# Patient Record
Sex: FEMALE | Race: WHITE | NOT HISPANIC OR LATINO | URBAN - METROPOLITAN AREA
[De-identification: names, ages, dates, MRNs, and addresses within clinical notes are randomized per-mention and may not be internally consistent; named-entity substitution may affect disease eponyms.]

---

## 2018-01-01 ENCOUNTER — INPATIENT (INPATIENT)
Facility: HOSPITAL | Age: 83
LOS: 1 days | End: 2018-10-15
Attending: INTERNAL MEDICINE | Admitting: INTERNAL MEDICINE
Payer: COMMERCIAL

## 2018-01-01 VITALS — TEMPERATURE: 99 F

## 2018-01-01 VITALS
SYSTOLIC BLOOD PRESSURE: 87 MMHG | OXYGEN SATURATION: 99 % | DIASTOLIC BLOOD PRESSURE: 51 MMHG | RESPIRATION RATE: 24 BRPM | HEART RATE: 93 BPM

## 2018-01-01 DIAGNOSIS — Z90.11 ACQUIRED ABSENCE OF RIGHT BREAST AND NIPPLE: Chronic | ICD-10-CM

## 2018-01-01 LAB
% ALBUMIN: 40.7 % — SIGNIFICANT CHANGE UP
% ALPHA 1: 7.7 % — SIGNIFICANT CHANGE UP
% ALPHA 2: 15.5 % — SIGNIFICANT CHANGE UP
% BETA: 12.7 % — SIGNIFICANT CHANGE UP
% GAMMA: 23.4 % — SIGNIFICANT CHANGE UP
ABO RH CONFIRMATION: SIGNIFICANT CHANGE UP
ALBUMIN SERPL ELPH-MCNC: 2.6 G/DL — LOW (ref 3.6–5.5)
ALBUMIN SERPL ELPH-MCNC: 3 G/DL — LOW (ref 3.5–5.2)
ALBUMIN SERPL ELPH-MCNC: 3.4 G/DL — LOW (ref 3.5–5.2)
ALBUMIN/GLOB SERPL ELPH: 0.7 RATIO — SIGNIFICANT CHANGE UP
ALP SERPL-CCNC: 49 U/L — SIGNIFICANT CHANGE UP (ref 30–115)
ALP SERPL-CCNC: 52 U/L — SIGNIFICANT CHANGE UP (ref 30–115)
ALPHA1 GLOB SERPL ELPH-MCNC: 0.5 G/DL — HIGH (ref 0.1–0.4)
ALPHA2 GLOB SERPL ELPH-MCNC: 1 G/DL — SIGNIFICANT CHANGE UP (ref 0.5–1)
ALT FLD-CCNC: 375 U/L — HIGH (ref 0–41)
ALT FLD-CCNC: 9 U/L — SIGNIFICANT CHANGE UP (ref 0–41)
ANION GAP SERPL CALC-SCNC: 25 MMOL/L — HIGH (ref 7–14)
ANION GAP SERPL CALC-SCNC: 26 MMOL/L — HIGH (ref 7–14)
APPEARANCE UR: CLEAR — SIGNIFICANT CHANGE UP
APTT BLD: 23.4 SEC — CRITICAL LOW (ref 27–39.2)
APTT BLD: 25.9 SEC — LOW (ref 27–39.2)
AST SERPL-CCNC: 15 U/L — SIGNIFICANT CHANGE UP (ref 0–41)
AST SERPL-CCNC: 678 U/L — HIGH (ref 0–41)
B-GLOBULIN SERPL ELPH-MCNC: 0.8 G/DL — SIGNIFICANT CHANGE UP (ref 0.5–1)
BACTERIA # UR AUTO: ABNORMAL
BASOPHILS # BLD AUTO: 0 K/UL — SIGNIFICANT CHANGE UP (ref 0–0.2)
BASOPHILS NFR BLD AUTO: 0 % — SIGNIFICANT CHANGE UP (ref 0–1)
BILIRUB DIRECT SERPL-MCNC: 0.3 MG/DL — HIGH (ref 0–0.2)
BILIRUB INDIRECT FLD-MCNC: 0.7 MG/DL — SIGNIFICANT CHANGE UP (ref 0.2–1.2)
BILIRUB SERPL-MCNC: 0.9 MG/DL — SIGNIFICANT CHANGE UP (ref 0.2–1.2)
BILIRUB SERPL-MCNC: 1 MG/DL — SIGNIFICANT CHANGE UP (ref 0.2–1.2)
BILIRUB UR-MCNC: NEGATIVE — SIGNIFICANT CHANGE UP
BUN SERPL-MCNC: 20 MG/DL — SIGNIFICANT CHANGE UP (ref 10–20)
BUN SERPL-MCNC: 34 MG/DL — HIGH (ref 10–20)
BURR CELLS BLD QL SMEAR: PRESENT — SIGNIFICANT CHANGE UP
CALCIUM SERPL-MCNC: 7.6 MG/DL — LOW (ref 8.5–10.1)
CALCIUM SERPL-MCNC: 8.5 MG/DL — SIGNIFICANT CHANGE UP (ref 8.5–10.1)
CHLORIDE SERPL-SCNC: 90 MMOL/L — LOW (ref 98–110)
CHLORIDE SERPL-SCNC: 94 MMOL/L — LOW (ref 98–110)
CK MB CFR SERPL CALC: 132.2 NG/ML — HIGH (ref 0.6–6.3)
CK SERPL-CCNC: 1626 U/L — HIGH (ref 0–225)
CO2 SERPL-SCNC: 13 MMOL/L — LOW (ref 17–32)
CO2 SERPL-SCNC: 15 MMOL/L — LOW (ref 17–32)
COLOR SPEC: YELLOW — SIGNIFICANT CHANGE UP
CREAT SERPL-MCNC: 1 MG/DL — SIGNIFICANT CHANGE UP (ref 0.7–1.5)
CREAT SERPL-MCNC: 1.5 MG/DL — SIGNIFICANT CHANGE UP (ref 0.7–1.5)
CULTURE RESULTS: NO GROWTH — SIGNIFICANT CHANGE UP
CULTURE RESULTS: NO GROWTH — SIGNIFICANT CHANGE UP
DIFF PNL FLD: ABNORMAL
EOSINOPHIL # BLD AUTO: 0 K/UL — SIGNIFICANT CHANGE UP (ref 0–0.7)
EOSINOPHIL NFR BLD AUTO: 0 % — SIGNIFICANT CHANGE UP (ref 0–8)
EPI CELLS # UR: ABNORMAL /HPF
ESTIMATED AVERAGE GLUCOSE: 160 MG/DL — HIGH (ref 68–114)
FERRITIN SERPL-MCNC: 1232 NG/ML — HIGH (ref 15–150)
FOLATE SERPL-MCNC: >20 NG/ML — SIGNIFICANT CHANGE UP
GAMMA GLOBULIN: 1.5 G/DL — SIGNIFICANT CHANGE UP (ref 0.6–1.6)
GAS PNL BLDA: SIGNIFICANT CHANGE UP
GLUCOSE BLDC GLUCOMTR-MCNC: 203 MG/DL — HIGH (ref 70–99)
GLUCOSE BLDC GLUCOMTR-MCNC: 204 MG/DL — HIGH (ref 70–99)
GLUCOSE BLDC GLUCOMTR-MCNC: 215 MG/DL — HIGH (ref 70–99)
GLUCOSE BLDC GLUCOMTR-MCNC: 239 MG/DL — HIGH (ref 70–99)
GLUCOSE BLDC GLUCOMTR-MCNC: 241 MG/DL — HIGH (ref 70–99)
GLUCOSE BLDC GLUCOMTR-MCNC: 265 MG/DL — HIGH (ref 70–99)
GLUCOSE BLDC GLUCOMTR-MCNC: 279 MG/DL — HIGH (ref 70–99)
GLUCOSE BLDC GLUCOMTR-MCNC: 308 MG/DL — HIGH (ref 70–99)
GLUCOSE BLDC GLUCOMTR-MCNC: 330 MG/DL — HIGH (ref 70–99)
GLUCOSE SERPL-MCNC: 223 MG/DL — HIGH (ref 70–99)
GLUCOSE SERPL-MCNC: 272 MG/DL — HIGH (ref 70–99)
GLUCOSE UR QL: NEGATIVE MG/DL — SIGNIFICANT CHANGE UP
HAPTOGLOB SERPL-MCNC: 373 MG/DL — HIGH (ref 34–200)
HAV IGM SER-ACNC: SIGNIFICANT CHANGE UP
HBA1C BLD-MCNC: 7.2 % — HIGH (ref 4–5.6)
HBV CORE IGM SER-ACNC: SIGNIFICANT CHANGE UP
HBV SURFACE AG SER-ACNC: SIGNIFICANT CHANGE UP
HCT VFR BLD CALC: 25.3 % — LOW (ref 37–47)
HCT VFR BLD CALC: 26.5 % — LOW (ref 37–47)
HCT VFR BLD CALC: 26.7 % — LOW (ref 37–47)
HCV AB S/CO SERPL IA: 0.16 S/CO — SIGNIFICANT CHANGE UP
HCV AB SERPL-IMP: SIGNIFICANT CHANGE UP
HGB BLD-MCNC: 8 G/DL — LOW (ref 12–16)
HGB BLD-MCNC: 8.1 G/DL — LOW (ref 12–16)
HGB BLD-MCNC: 8.9 G/DL — LOW (ref 12–16)
HIV 1+2 AB+HIV1 P24 AG SERPL QL IA: SIGNIFICANT CHANGE UP
IMM GRANULOCYTES NFR BLD AUTO: 2.3 % — HIGH (ref 0.1–0.3)
INR BLD: 1.73 RATIO — HIGH (ref 0.65–1.3)
INR BLD: 1.78 RATIO — HIGH (ref 0.65–1.3)
INTERPRETATION SERPL IFE-IMP: SIGNIFICANT CHANGE UP
KETONES UR-MCNC: ABNORMAL
LACTATE SERPL-SCNC: 4.7 MMOL/L — CRITICAL HIGH (ref 0.5–2.2)
LACTATE SERPL-SCNC: 5.7 MMOL/L — CRITICAL HIGH (ref 0.5–2.2)
LDH SERPL L TO P-CCNC: 186 — SIGNIFICANT CHANGE UP (ref 50–242)
LEUKOCYTE ESTERASE UR-ACNC: NEGATIVE — SIGNIFICANT CHANGE UP
LIDOCAIN IGE QN: 9 U/L — SIGNIFICANT CHANGE UP (ref 7–60)
LYMPHOCYTES # BLD AUTO: 0.23 K/UL — LOW (ref 1.2–3.4)
LYMPHOCYTES # BLD AUTO: 53.5 % — HIGH (ref 20.5–51.1)
MCHC RBC-ENTMCNC: 28.2 PG — SIGNIFICANT CHANGE UP (ref 27–31)
MCHC RBC-ENTMCNC: 28.6 PG — SIGNIFICANT CHANGE UP (ref 27–31)
MCHC RBC-ENTMCNC: 28.8 PG — SIGNIFICANT CHANGE UP (ref 27–31)
MCHC RBC-ENTMCNC: 30 G/DL — LOW (ref 32–37)
MCHC RBC-ENTMCNC: 32 G/DL — SIGNIFICANT CHANGE UP (ref 32–37)
MCHC RBC-ENTMCNC: 33.6 G/DL — SIGNIFICANT CHANGE UP (ref 32–37)
MCV RBC AUTO: 85.8 FL — SIGNIFICANT CHANGE UP (ref 81–99)
MCV RBC AUTO: 88.2 FL — SIGNIFICANT CHANGE UP (ref 81–99)
MCV RBC AUTO: 95.4 FL — SIGNIFICANT CHANGE UP (ref 81–99)
MONOCYTES # BLD AUTO: 0.15 K/UL — SIGNIFICANT CHANGE UP (ref 0.1–0.6)
MONOCYTES NFR BLD AUTO: 34.9 % — HIGH (ref 1.7–9.3)
NEUTROPHILS # BLD AUTO: 0.04 K/UL — LOW (ref 1.4–6.5)
NEUTROPHILS NFR BLD AUTO: 9.3 % — LOW (ref 42.2–75.2)
NITRITE UR-MCNC: NEGATIVE — SIGNIFICANT CHANGE UP
NRBC # BLD: 0 /100 WBCS — SIGNIFICANT CHANGE UP (ref 0–0)
NRBC # BLD: 2 /100 — HIGH (ref 0–0)
NRBC # BLD: 3 /100 WBCS — HIGH (ref 0–0)
NRBC # BLD: 5 /100 WBCS — HIGH (ref 0–0)
NT-PROBNP SERPL-SCNC: 3461 PG/ML — HIGH (ref 0–300)
PH UR: 5.5 — SIGNIFICANT CHANGE UP (ref 5–8)
PLAT MORPH BLD: NORMAL — SIGNIFICANT CHANGE UP
PLATELET # BLD AUTO: 15 K/UL — CRITICAL LOW (ref 130–400)
PLATELET # BLD AUTO: 15 K/UL — CRITICAL LOW (ref 130–400)
PLATELET # BLD AUTO: 21 K/UL — LOW (ref 130–400)
PLATELET COUNT - ESTIMATE: ABNORMAL
POTASSIUM SERPL-MCNC: 4.5 MMOL/L — SIGNIFICANT CHANGE UP (ref 3.5–5)
POTASSIUM SERPL-MCNC: 4.9 MMOL/L — SIGNIFICANT CHANGE UP (ref 3.5–5)
POTASSIUM SERPL-SCNC: 4.5 MMOL/L — SIGNIFICANT CHANGE UP (ref 3.5–5)
POTASSIUM SERPL-SCNC: 4.9 MMOL/L — SIGNIFICANT CHANGE UP (ref 3.5–5)
PROT PATTERN SERPL ELPH-IMP: SIGNIFICANT CHANGE UP
PROT SERPL-MCNC: 6.3 G/DL — SIGNIFICANT CHANGE UP (ref 6–8.3)
PROT SERPL-MCNC: 6.3 G/DL — SIGNIFICANT CHANGE UP (ref 6–8.3)
PROT SERPL-MCNC: 6.4 G/DL — SIGNIFICANT CHANGE UP (ref 6–8)
PROT SERPL-MCNC: 7.2 G/DL — SIGNIFICANT CHANGE UP (ref 6–8)
PROT UR-MCNC: 30 MG/DL
PROTHROM AB SERPL-ACNC: 18.9 SEC — HIGH (ref 9.95–12.87)
PROTHROM AB SERPL-ACNC: 19.4 SEC — HIGH (ref 9.95–12.87)
RBC # BLD: 2.8 M/UL — LOW (ref 4.2–5.4)
RBC # BLD: 2.87 M/UL — LOW (ref 4.2–5.4)
RBC # BLD: 3.03 M/UL — LOW (ref 4.2–5.4)
RBC # BLD: 3.09 M/UL — LOW (ref 4.2–5.4)
RBC # FLD: 15.7 % — HIGH (ref 11.5–14.5)
RBC # FLD: 16.5 % — HIGH (ref 11.5–14.5)
RBC # FLD: 16.7 % — HIGH (ref 11.5–14.5)
RBC BLD AUTO: ABNORMAL
RBC CASTS # UR COMP ASSIST: ABNORMAL /HPF
RETICS #: 46.8 K/UL — SIGNIFICANT CHANGE UP (ref 25–125)
RETICS/RBC NFR: 1.7 % — HIGH (ref 0.5–1.5)
SCHISTOCYTES BLD QL AUTO: SLIGHT — SIGNIFICANT CHANGE UP
SODIUM SERPL-SCNC: 130 MMOL/L — LOW (ref 135–146)
SODIUM SERPL-SCNC: 133 MMOL/L — LOW (ref 135–146)
SP GR SPEC: 1.02 — SIGNIFICANT CHANGE UP (ref 1.01–1.03)
SPECIMEN SOURCE: SIGNIFICANT CHANGE UP
SPECIMEN SOURCE: SIGNIFICANT CHANGE UP
TROPONIN T SERPL-MCNC: 0.04 NG/ML — CRITICAL HIGH
TROPONIN T SERPL-MCNC: 10.58 NG/ML — CRITICAL HIGH
TROPONIN T SERPL-MCNC: 2.78 NG/ML — CRITICAL HIGH
TSH SERPL-MCNC: 0.14 UIU/ML — LOW (ref 0.27–4.2)
TYPE + AB SCN PNL BLD: SIGNIFICANT CHANGE UP
UROBILINOGEN FLD QL: 0.2 MG/DL — SIGNIFICANT CHANGE UP (ref 0.2–0.2)
VARIANT LYMPHS # BLD: 16 % — HIGH (ref 0–5)
VIT B12 SERPL-MCNC: 495 PG/ML — SIGNIFICANT CHANGE UP (ref 232–1245)
WBC # BLD: 0.43 K/UL — CRITICAL LOW (ref 4.8–10.8)
WBC # BLD: 0.74 K/UL — CRITICAL LOW (ref 4.8–10.8)
WBC # BLD: 1.85 K/UL — LOW (ref 4.8–10.8)
WBC # FLD AUTO: 0.43 K/UL — CRITICAL LOW (ref 4.8–10.8)
WBC # FLD AUTO: 0.74 K/UL — CRITICAL LOW (ref 4.8–10.8)
WBC # FLD AUTO: 1.85 K/UL — LOW (ref 4.8–10.8)
WBC UR QL: SIGNIFICANT CHANGE UP /HPF

## 2018-01-01 PROCEDURE — 93970 EXTREMITY STUDY: CPT | Mod: 26

## 2018-01-01 RX ORDER — ONDANSETRON 8 MG/1
4 TABLET, FILM COATED ORAL EVERY 8 HOURS
Qty: 0 | Refills: 0 | Status: DISCONTINUED | OUTPATIENT
Start: 2018-01-01 | End: 2018-01-01

## 2018-01-01 RX ORDER — DEXTROSE 50 % IN WATER 50 %
25 SYRINGE (ML) INTRAVENOUS ONCE
Qty: 0 | Refills: 0 | Status: DISCONTINUED | OUTPATIENT
Start: 2018-01-01 | End: 2018-01-01

## 2018-01-01 RX ORDER — INSULIN LISPRO 100/ML
3 VIAL (ML) SUBCUTANEOUS
Qty: 0 | Refills: 0 | Status: DISCONTINUED | OUTPATIENT
Start: 2018-01-01 | End: 2018-01-01

## 2018-01-01 RX ORDER — ATORVASTATIN CALCIUM 80 MG/1
10 TABLET, FILM COATED ORAL AT BEDTIME
Qty: 0 | Refills: 0 | Status: DISCONTINUED | OUTPATIENT
Start: 2018-01-01 | End: 2018-01-01

## 2018-01-01 RX ORDER — DEXTROSE 50 % IN WATER 50 %
12.5 SYRINGE (ML) INTRAVENOUS ONCE
Qty: 0 | Refills: 0 | Status: DISCONTINUED | OUTPATIENT
Start: 2018-01-01 | End: 2018-01-01

## 2018-01-01 RX ORDER — MEROPENEM 1 G/30ML
1000 INJECTION INTRAVENOUS EVERY 12 HOURS
Qty: 0 | Refills: 0 | Status: DISCONTINUED | OUTPATIENT
Start: 2018-01-01 | End: 2018-01-01

## 2018-01-01 RX ORDER — FUROSEMIDE 40 MG
40 TABLET ORAL ONCE
Qty: 0 | Refills: 0 | Status: COMPLETED | OUTPATIENT
Start: 2018-01-01 | End: 2018-01-01

## 2018-01-01 RX ORDER — SODIUM CHLORIDE 9 MG/ML
1000 INJECTION INTRAMUSCULAR; INTRAVENOUS; SUBCUTANEOUS
Qty: 0 | Refills: 0 | Status: DISCONTINUED | OUTPATIENT
Start: 2018-01-01 | End: 2018-01-01

## 2018-01-01 RX ORDER — GLUCAGON INJECTION, SOLUTION 0.5 MG/.1ML
1 INJECTION, SOLUTION SUBCUTANEOUS ONCE
Qty: 0 | Refills: 0 | Status: DISCONTINUED | OUTPATIENT
Start: 2018-01-01 | End: 2018-01-01

## 2018-01-01 RX ORDER — LEVOTHYROXINE SODIUM 125 MCG
1 TABLET ORAL
Qty: 0 | Refills: 0 | COMMUNITY

## 2018-01-01 RX ORDER — ATORVASTATIN CALCIUM 80 MG/1
1 TABLET, FILM COATED ORAL
Qty: 0 | Refills: 0 | COMMUNITY

## 2018-01-01 RX ORDER — GLIMEPIRIDE 1 MG
1 TABLET ORAL
Qty: 0 | Refills: 0 | COMMUNITY

## 2018-01-01 RX ORDER — LEVOTHYROXINE SODIUM 125 MCG
100 TABLET ORAL DAILY
Qty: 0 | Refills: 0 | Status: DISCONTINUED | OUTPATIENT
Start: 2018-01-01 | End: 2018-01-01

## 2018-01-01 RX ORDER — INSULIN GLARGINE 100 [IU]/ML
10 INJECTION, SOLUTION SUBCUTANEOUS AT BEDTIME
Qty: 0 | Refills: 0 | Status: DISCONTINUED | OUTPATIENT
Start: 2018-01-01 | End: 2018-01-01

## 2018-01-01 RX ORDER — INFLUENZA VIRUS VACCINE 15; 15; 15; 15 UG/.5ML; UG/.5ML; UG/.5ML; UG/.5ML
0.5 SUSPENSION INTRAMUSCULAR ONCE
Qty: 0 | Refills: 0 | Status: DISCONTINUED | OUTPATIENT
Start: 2018-01-01 | End: 2018-01-01

## 2018-01-01 RX ORDER — SODIUM CHLORIDE 9 MG/ML
500 INJECTION INTRAMUSCULAR; INTRAVENOUS; SUBCUTANEOUS ONCE
Qty: 0 | Refills: 0 | Status: COMPLETED | OUTPATIENT
Start: 2018-01-01 | End: 2018-01-01

## 2018-01-01 RX ORDER — DEXTROSE 50 % IN WATER 50 %
15 SYRINGE (ML) INTRAVENOUS ONCE
Qty: 0 | Refills: 0 | Status: DISCONTINUED | OUTPATIENT
Start: 2018-01-01 | End: 2018-01-01

## 2018-01-01 RX ORDER — NOREPINEPHRINE BITARTRATE/D5W 8 MG/250ML
0.05 PLASTIC BAG, INJECTION (ML) INTRAVENOUS
Qty: 8 | Refills: 0 | Status: DISCONTINUED | OUTPATIENT
Start: 2018-01-01 | End: 2018-01-01

## 2018-01-01 RX ORDER — ASPIRIN/CALCIUM CARB/MAGNESIUM 324 MG
81 TABLET ORAL DAILY
Qty: 0 | Refills: 0 | Status: DISCONTINUED | OUTPATIENT
Start: 2018-01-01 | End: 2018-01-01

## 2018-01-01 RX ORDER — MORPHINE SULFATE 50 MG/1
2 CAPSULE, EXTENDED RELEASE ORAL ONCE
Qty: 0 | Refills: 0 | Status: DISCONTINUED | OUTPATIENT
Start: 2018-01-01 | End: 2018-01-01

## 2018-01-01 RX ORDER — HEPARIN SODIUM 5000 [USP'U]/ML
5000 INJECTION INTRAVENOUS; SUBCUTANEOUS EVERY 8 HOURS
Qty: 0 | Refills: 0 | Status: DISCONTINUED | OUTPATIENT
Start: 2018-01-01 | End: 2018-01-01

## 2018-01-01 RX ORDER — PHYTONADIONE (VIT K1) 5 MG
5 TABLET ORAL ONCE
Qty: 0 | Refills: 0 | Status: COMPLETED | OUTPATIENT
Start: 2018-01-01 | End: 2018-01-01

## 2018-01-01 RX ORDER — SODIUM CHLORIDE 9 MG/ML
1000 INJECTION, SOLUTION INTRAVENOUS
Qty: 0 | Refills: 0 | Status: DISCONTINUED | OUTPATIENT
Start: 2018-01-01 | End: 2018-01-01

## 2018-01-01 RX ORDER — PANTOPRAZOLE SODIUM 20 MG/1
40 TABLET, DELAYED RELEASE ORAL
Qty: 0 | Refills: 0 | Status: DISCONTINUED | OUTPATIENT
Start: 2018-01-01 | End: 2018-01-01

## 2018-01-01 RX ORDER — VANCOMYCIN HCL 1 G
1000 VIAL (EA) INTRAVENOUS EVERY 12 HOURS
Qty: 0 | Refills: 0 | Status: DISCONTINUED | OUTPATIENT
Start: 2018-01-01 | End: 2018-01-01

## 2018-01-01 RX ORDER — SODIUM CHLORIDE 9 MG/ML
1000 INJECTION INTRAMUSCULAR; INTRAVENOUS; SUBCUTANEOUS ONCE
Qty: 0 | Refills: 0 | Status: COMPLETED | OUTPATIENT
Start: 2018-01-01 | End: 2018-01-01

## 2018-01-01 RX ADMIN — Medication 3 UNIT(S): at 17:20

## 2018-01-01 RX ADMIN — Medication 3 UNIT(S): at 12:39

## 2018-01-01 RX ADMIN — Medication 250 MILLIGRAM(S): at 06:16

## 2018-01-01 RX ADMIN — SODIUM CHLORIDE 2000 MILLILITER(S): 9 INJECTION INTRAMUSCULAR; INTRAVENOUS; SUBCUTANEOUS at 12:28

## 2018-01-01 RX ADMIN — Medication 40 MILLIGRAM(S): at 15:35

## 2018-01-01 RX ADMIN — SODIUM CHLORIDE 75 MILLILITER(S): 9 INJECTION INTRAMUSCULAR; INTRAVENOUS; SUBCUTANEOUS at 18:53

## 2018-01-01 RX ADMIN — INSULIN GLARGINE 10 UNIT(S): 100 INJECTION, SOLUTION SUBCUTANEOUS at 22:16

## 2018-01-01 RX ADMIN — MORPHINE SULFATE 2 MILLIGRAM(S): 50 CAPSULE, EXTENDED RELEASE ORAL at 02:22

## 2018-01-01 RX ADMIN — ONDANSETRON 4 MILLIGRAM(S): 8 TABLET, FILM COATED ORAL at 21:00

## 2018-01-01 RX ADMIN — Medication 5.06 MICROGRAM(S)/KG/MIN: at 01:24

## 2018-01-01 RX ADMIN — ONDANSETRON 4 MILLIGRAM(S): 8 TABLET, FILM COATED ORAL at 10:00

## 2018-01-01 RX ADMIN — Medication 100 MICROGRAM(S): at 06:15

## 2018-01-01 RX ADMIN — Medication 3 UNIT(S): at 07:44

## 2018-01-01 RX ADMIN — ATORVASTATIN CALCIUM 10 MILLIGRAM(S): 80 TABLET, FILM COATED ORAL at 23:22

## 2018-01-01 RX ADMIN — Medication 5 MILLIGRAM(S): at 17:21

## 2018-01-01 RX ADMIN — Medication 81 MILLIGRAM(S): at 22:15

## 2018-01-01 RX ADMIN — MEROPENEM 100 MILLIGRAM(S): 1 INJECTION INTRAVENOUS at 05:27

## 2018-01-01 RX ADMIN — ONDANSETRON 4 MILLIGRAM(S): 8 TABLET, FILM COATED ORAL at 22:13

## 2018-01-01 RX ADMIN — Medication 250 MILLIGRAM(S): at 20:12

## 2018-01-01 RX ADMIN — PANTOPRAZOLE SODIUM 40 MILLIGRAM(S): 20 TABLET, DELAYED RELEASE ORAL at 06:15

## 2018-01-01 RX ADMIN — SODIUM CHLORIDE 1000 MILLILITER(S): 9 INJECTION INTRAMUSCULAR; INTRAVENOUS; SUBCUTANEOUS at 06:30

## 2018-01-01 RX ADMIN — SODIUM CHLORIDE 1000 MILLILITER(S): 9 INJECTION INTRAMUSCULAR; INTRAVENOUS; SUBCUTANEOUS at 18:53

## 2018-01-01 RX ADMIN — MEROPENEM 100 MILLIGRAM(S): 1 INJECTION INTRAVENOUS at 20:11

## 2018-01-01 RX ADMIN — Medication 250 MILLIGRAM(S): at 17:20

## 2018-01-01 RX ADMIN — ATORVASTATIN CALCIUM 10 MILLIGRAM(S): 80 TABLET, FILM COATED ORAL at 21:50

## 2018-01-01 RX ADMIN — MEROPENEM 100 MILLIGRAM(S): 1 INJECTION INTRAVENOUS at 17:20

## 2018-01-01 RX ADMIN — INSULIN GLARGINE 10 UNIT(S): 100 INJECTION, SOLUTION SUBCUTANEOUS at 21:53

## 2018-01-01 RX ADMIN — SODIUM CHLORIDE 1000 MILLILITER(S): 9 INJECTION INTRAMUSCULAR; INTRAVENOUS; SUBCUTANEOUS at 16:04

## 2018-10-13 NOTE — ED PROVIDER NOTE - NS ED ROS FT
Review of Systems  Constitutional: (-) fever  Eyes/ENT: (-) blurry vision, (-) epistaxis  Cardiovascular: (-) chest pain, (-) syncope  Respiratory: (-) cough, (+) shortness of breath  Gastrointestinal: (-) vomiting, (-) diarrhea  Musculoskeletal: (-) neck pain, (-) back pain, (-) joint pain  Integumentary: (-) rash, (-) edema  Neurological: (-) headache

## 2018-10-13 NOTE — ED PROVIDER NOTE - OBJECTIVE STATEMENT
Patient is a 85 years old F pmhx DM, Thyroid disease, hypercholesterolemia, osteo presents to the ED for evaluation of shortness of breath x 1 day. As per patient was seen at urgent care Patient is a 85 years old F pmhx DM, Thyroid disease, hypercholesterolemia, osteo presents to the ED for evaluation of shortness of breath x 1 day. As per patient was seen at urgent care this week for cough and cold like symptoms and instructed on likely viral illness.

## 2018-10-13 NOTE — CONSULT NOTE ADULT - SUBJECTIVE AND OBJECTIVE BOX
Patient is a 85y old  Female who presents with a chief complaint of SOB.    HPI: 85 F with PMH as below p/w worsening SOB for one week. She went to urgent care one week ago where they did an EKG and it showed LBBB. They recommended her to f/u with PMD. She was having worsening SOB since then and so the daughter brought her to ER today. She was placed on Bipap and she felt comfortable after placing Bipap.     She was seen by PMD () in August 2018 and the blood work was normal as per family. Blood work done at [facility].    Lives at home by herself. Fully functional one week ago. She was c/o fatigue, loss of appetite, night sweats for last one week.     Full code.      PAST MEDICAL & SURGICAL HISTORY:  Osteoporosis  High cholesterol  HTN (hypertension)  Hypothyroid  Diabetes  Mastectomy    Allergies  penicillins (Urticaria)      Family history : no cardiovscular family history     Home Medications:  enalapril 5 mg oral tablet: 1 tab(s) orally once a day (13 Oct 2018 11:14)  glimepiride 2 mg oral tablet: 1 tab(s) orally once a day (13 Oct 2018 11:14)  Lipitor 10 mg oral tablet: 1 tab(s) orally once a day (13 Oct 2018 11:14)  Synthroid 100 mcg (0.1 mg) oral tablet: 1 tab(s) orally once a day (13 Oct 2018 11:14)    Occupation:  Alochol: Denied  Smoking: Denied  Drug Use: Denied          ROS: as in HPI; All other systems reviewed are negative    ICU Vital Signs Last 24 Hrs  T(C): 37.4 (13 Oct 2018 11:06), Max: 37.4 (13 Oct 2018 11:06)  T(F): 99.4 (13 Oct 2018 11:06), Max: 99.4 (13 Oct 2018 11:06)  HR: 98 (13 Oct 2018 16:20) (98 - 106)  BP: 103/53 (13 Oct 2018 16:20) (103/53 - 118/78)  BP(mean): --  ABP: --  ABP(mean): --  RR: 28 (13 Oct 2018 16:20) (28 - 32)  SpO2: 99% (13 Oct 2018 16:20) (80% - 100%)        Physical Examination:    General: In mild distress. On Bipap.    HEENT: Pupils equal, reactive to light.  Symmetric.    PULM: Clear to auscultation bilaterally, no significant sputum production    CVS: Regular rate and rhythm, no murmurs, rubs, or gallops    ABD: Soft, nondistended, nontender, normoactive bowel sounds, no masses    EXT: No edema, nontender, no clubbing     SKIN: Warm and well perfused, no rashes noted.    Neurology : no motor or sensory deficit     Musculoskeletal : move all extremety     Lymphatic system: no Palpable node           ABG - ( 13 Oct 2018 16:05 )  pH, Arterial: 7.14  pH, Blood: x     /  pCO2: 21    /  pO2: 425   / HCO3: 7     / Base Excess: -20.1 /  SaO2: 100                 I&O's Detail        LABS:                        8.0    1.85  )-----------( 21       ( 13 Oct 2018 15:58 )             26.7     13 Oct 2018 12:30    130    |  90     |  20     ----------------------------<  272    4.5     |  15     |  1.0      Ca    8.5        13 Oct 2018 12:30    TPro  7.2    /  Alb  3.4    /  TBili  1.0    /  DBili  0.3    /  AST  15     /  ALT  9      /  AlkPhos  49     13 Oct 2018 12:30  Amylase x     lipase 9          CARDIAC MARKERS ( 13 Oct 2018 12:30 )  x     / 0.04 ng/mL / x     / x     / x          CAPILLARY BLOOD GLUCOSE            Culture    Lactate, Blood: 4.7 mmol/L (10-13-18 @ 12:30)      MEDICATIONS  (STANDING):    MEDICATIONS  (PRN):        RADIOLOGY: ***     CXR:  TLC:  OG:  ET tube:          ECHO: Patient is a 85y old  Female who presents with a chief complaint of SOB.    Hx Obtained mainly from daughter ate bedside.    HPI: 85 F with PMH as below p/w worsening SOB for almost 2 weeks. She went to urgent care one week ago where they did an EKG and it showed LBBB. They recommended her to f/u with PMD. She was having worsening SOB since then and so the daughter brought her to ER today. She was placed on Bipap and she felt comfortable after placing Bipap.     She was seen by PMD () in August 2018 and the blood work was normal as per family. Blood work done at [facility].    Lives at home by herself. Fully functional prior. She was c/o fatigue, loss of appetite, night sweats for last one week.  no fever, chills, abdominal pain reports weight loss, in ED found to have pancytopenia, and high LA, called to evaluate    Full code.      PAST MEDICAL & SURGICAL HISTORY:  Osteoporosis  High cholesterol  HTN (hypertension)  Hypothyroid  Diabetes  Mastectomy    Allergies  penicillins (Urticaria)      Family history : no cardiovscular family history     Home Medications:  enalapril 5 mg oral tablet: 1 tab(s) orally once a day (13 Oct 2018 11:14)  glimepiride 2 mg oral tablet: 1 tab(s) orally once a day (13 Oct 2018 11:14)  Lipitor 10 mg oral tablet: 1 tab(s) orally once a day (13 Oct 2018 11:14)  Synthroid 100 mcg (0.1 mg) oral tablet: 1 tab(s) orally once a day (13 Oct 2018 11:14)    Occupation:  Alochol: Denied  Smoking: Denied  Drug Use: Denied          ROS: as in HPI; All other systems reviewed are negative    ICU Vital Signs Last 24 Hrs  T(C): 37.4 (13 Oct 2018 11:06), Max: 37.4 (13 Oct 2018 11:06)  T(F): 99.4 (13 Oct 2018 11:06), Max: 99.4 (13 Oct 2018 11:06)  HR: 98 (13 Oct 2018 16:20) (98 - 106)  BP: 103/53 (13 Oct 2018 16:20) (103/53 - 118/78)  RR: 28 (13 Oct 2018 16:20) (28 - 32)  SpO2: 99% (13 Oct 2018 16:20) (80% - 100%)        Physical Examination:    General: In mild distress. On Bipap. ill looking    HEENT: Pupils equal, reactive to light.  Symmetric. pale conjunctiva    PULM: Clear to auscultation bilaterally, no significant sputum production    CVS: Regular rate and rhythm, no murmurs, rubs, or gallops    ABD: Soft, nondistended, nontender, normoactive bowel sounds, no masses    EXT: No edema, nontender, no clubbing     SKIN: Warm and well perfused, no rashes noted.    Neurology : no motor or sensory deficit     Musculoskeletal : move all extremities    Lymphatic system: no Palpable node           ABG - ( 13 Oct 2018 16:05 )  pH, Arterial: 7.14  pH, Blood: x     /  pCO2: 21    /  pO2: 425   / HCO3: 7     / Base Excess: -20.1 /  SaO2: 100                 I&O's Detail        LABS:                        8.0    1.85  )-----------( 21       ( 13 Oct 2018 15:58 )             26.7     13 Oct 2018 12:30    130    |  90     |  20     ----------------------------<  272    4.5     |  15     |  1.0      Ca    8.5        13 Oct 2018 12:30    TPro  7.2    /  Alb  3.4    /  TBili  1.0    /  DBili  0.3    /  AST  15     /  ALT  9      /  AlkPhos  49     13 Oct 2018 12:30  Amylase x     lipase 9          CARDIAC MARKERS ( 13 Oct 2018 12:30 )  x     / 0.04 ng/mL / x     / x     / x               Culture    Lactate, Blood: 4.7 mmol/L (10-13-18 @ 12:30)    EKG/ CXR reviewed

## 2018-10-13 NOTE — CONSULT NOTE ADULT - ASSESSMENT
IMPRESSION: SOB secondary to ?Fluid overload                      Lactic acidosis                      Pancytopenia        PLAN:    CNS: No sedation. Alert and oriented now.     HEENT: HOB>45 degrees.     PULMONARY: Repeat lactate on Bipap. Continue Bipap for now. No lasix or fluids for now.     CARDIOVASCULAR: 2d echo. CE x 3. Cardiology consult.  Telemetry monitoring.     GI: GI prophylaxis.  Feeding     RENAL: Monitor lytes and replete as needed.    INFECTIOUS DISEASE: Pan cx. IV abx for now.    HEMATOLOGICAL:  PBS did not show any blasts or schistocytes. Need BM biospy. D/w Hemonc attending (). Will order blood work.     ENDOCRINE:  Follow up FS.  Insulin protocol if needed.    MUSCULOSKELETAL:        CRITICAL CARE TIME SPENT: *** IMPRESSION: SOB multifactorial/ anemia/ s/p IVF/ sepsis                      Lactic acidosis worsening                      Pancytopenia  	        NSTEMI      PLAN:    CNS: avoid CNS depressant    HEENT: HOB>45 degrees.     PULMONARY: Repeat lactate on Bipap. Continue Bipap for now. tf still acidotic bicarb drip    CARDIOVASCULAR: 2d echo. CE x 3. Cardiology consult.  Telemetry monitoring. ASA.    GI: GI prophylaxis.  Feeding     RENAL: Monitor lytes and replete as needed. REPEAT CMP    INFECTIOUS DISEASE: Pan cx. IV abx for now.    HEMATOLOGICAL:  PBS did not show any blasts or schistocytes. Need BM biospy. D/w Hemonc attending (). Will order blood work.     ENDOCRINE:  Follow up FS.  Insulin protocol if needed.    MUSCULOSKELETAL: BED REST  long discussion with daughter regarding GOC full code  poor prognosis IMPRESSION: SOB multifactorial/ anemia/ s/p IVF/ sepsis                      Lactic acidosis worsening                      Pancytopenia  	        NSTEMI      PLAN:    CNS: avoid CNS depressant    HEENT: HOB>45 degrees.     PULMONARY: Repeat lactate on Bipap. Continue Bipap for now. tf still acidotic bicarb drip    CARDIOVASCULAR: 2d echo. CE x 3. Cardiology consult.  Telemetry monitoring. ASA.    GI: GI prophylaxis.  Feeding     RENAL: Monitor lytes and replete as needed. REPEAT CMP    INFECTIOUS DISEASE: Pan cx. IV abx for now.    HEMATOLOGICAL:  PBS did not show any blasts or schistocytes. Need BM biospy. D/w Hemonc attending (). Will order blood work. LE doppler    ENDOCRINE:  Follow up FS.  Insulin protocol if needed.    MUSCULOSKELETAL: BED REST  long discussion with daughter regarding GOC full code  poor prognosis

## 2018-10-13 NOTE — ED PROVIDER NOTE - ATTENDING CONTRIBUTION TO CARE
Pt has a history of HTN, DM, elevated cholesterol who presents with one week of crescendo CARIAS. NO chest pain. NO leg pain> NO previous MI or CAD. NO COPD. On exam S1S2 rrr, lungs clear, no abdominal tenderness. No leg swelling or tenderness. Daughter states pt is only taking 3 steps and is short of breath. This seem to be an anginal equivalent. Pt will need admission for further evaluation.

## 2018-10-13 NOTE — ED PROVIDER NOTE - PROGRESS NOTE DETAILS
Pt with sudden worsening of shortness of breath, no chest pain, Now with JVD, Pt placed on bipap and given lasix 40 mg , and pt improving. Will also start a nitro drip. Pt also with pancytopenia.  Repeat EKG same LBBB. troponin 0.04. Spoke to Dr. terry. Will see her for CCU admission. ABG 7.14/20.7/425  lactate is 9.8. This is an uncompensated metabolic acidosis. This is on Bipap. Will lower O2. Hematology evaluation requested. case discussed with hematology fellow krystal will discuss with attending and call back Pt continues to improve. Able to talk. Hematology called and will consult

## 2018-10-13 NOTE — ED ADULT NURSE NOTE - NSIMPLEMENTINTERV_GEN_ALL_ED
Implemented All Universal Safety Interventions:  [place] to call system. Call bell, personal items and telephone within reach. Instruct patient to call for assistance. Room bathroom lighting operational. Non-slip footwear when patient is off stretcher. Physically safe environment: no spills, clutter or unnecessary equipment. Stretcher in lowest position, wheels locked, appropriate side rails in place.

## 2018-10-13 NOTE — ED ADULT NURSE NOTE - PMH
Diabetes    High cholesterol    HTN (hypertension)    Hypothyroid    Osteoporosis Breast cancer    Diabetes    High cholesterol    HTN (hypertension)    Hypothyroid    Osteoporosis

## 2018-10-13 NOTE — ED ADULT TRIAGE NOTE - CHIEF COMPLAINT QUOTE
As per daughter:" She has been short of breath for a week and she has abnormal EKG done last Sunday.'

## 2018-10-13 NOTE — ED PROVIDER NOTE - PHYSICAL EXAMINATION
Gen: Alert, NAD, well appearing  Head: NC, AT, PERRL, EOMI, normal lids/conjunctiva  ENT: normal hearing, patent oropharynx without erythema/exudate, uvula midline  Neck: +supple, no tenderness  Pulm: Bilateral BS, normal resp effort, no wheeze/stridor/retractions  CV: RRR  Abd: soft, NT/ND  Mskel: no edema  Skin: no rash  Neuro: AAOx3

## 2018-10-14 NOTE — PROGRESS NOTE ADULT - ASSESSMENT
#Pancytopenia, Acute suspect Acute Leukemia or high grade MDS less likley Aplastic anemia or infection   -reviewed  smear, there were no white cells only a singe normal monocyte and neutrophil was seen. Platelet count is low. NC/NC [place] cells seen    -folate, b12, Iron studies normal, ferritin is elevated due to acute phase reaction   -hep BC and HIV negative  -Peripheral Flow cytometrey was sent  -Unable to offer bone marrow biopsy in [facility] confirmed with lab they cant process it, when stable please transfer to [facility] for bone marrow  -keep hgb >7.5-8   -keep plts >20,000 she may have DIC , if bleeds keep ~50,000  -check Retic count    #Elevated INR suspect DIC from malignancy  and or infection   -can give vitamin K 5 mg po once   -if bleeds will need FFP and or Cryo  -check Fibrinogen with next blood work    #Shock possibly sepsis  -c/w  meropenem and vanc for  now monitor renal function   -follow up cultures    #SADAF with low urnie output  -on lasix      Elevated AST/ALT due to shock? vs meds  -monitor LFTs
We used Cheetah device to evaluate the pt's responsiveness to IVF bolus. SVI increased by 17% after giving 250 cc NS. We gave another 500 CC NS bolus and started the pt on maintenance NS@75 cc/hr.    I also discussed the case both with Dr Ramirez and Dr Hill. Per DR Hill, she probably has demand ischemia and the presence of an underlying cardiac disorder cannot be ruled out. ASA or heparin cannot be started at this point d/t low platelets. Pt will get 2D echo tomorrow.     Pt signed out tp overnight PA

## 2018-10-14 NOTE — H&P ADULT - HISTORY OF PRESENT ILLNESS
85 F with PMH as below p/w worsening SOB for almost 2 weeks. She went to urgent care one week ago where they did an EKG and it showed LBBB. They recommended her to f/u with PMD. She was having worsening SOB since then and so the daughter brought her to ER today. She was placed on Bipap and she felt comfortable after placing Bipap.     She was seen by PMD () in August 2018 and the blood work was normal as per family. Blood work done at [facility].    Lives at home by herself. Fully functional prior. She was c/o fatigue, loss of appetite, night sweats for last one week.  no fever, chills, abdominal pain reports weight loss.    Found to have pancytopenia, elevated bnp, with rising troponin (0.04 --> 2.78), and lactic acidosis on initial w/u.

## 2018-10-14 NOTE — PROGRESS NOTE ADULT - SUBJECTIVE AND OBJECTIVE BOX
I was called by the lab for troponin of 10.   I saw the pt at bedside. She did not have any CP or any new complaints. Continues to have mild SOB. Per RN, she has been oliguric throughout the day.    ICU Vital Signs Last 24 Hrs  T(C): 36.6 (14 Oct 2018 21:00), Max: 36.7 (14 Oct 2018 07:24)  T(F): 97.9 (14 Oct 2018 21:00), Max: 98.1 (14 Oct 2018 07:24)  HR: 87 (14 Oct 2018 19:08) (67 - 97)  BP: 104/58 (14 Oct 2018 19:08) (84/48 - 118/59)  BP(mean): 79 (14 Oct 2018 19:08) (61 - 84)  ABP: --  ABP(mean): --  RR: 20 (14 Oct 2018 21:00) (20 - 39)  SpO2: 95% (14 Oct 2018 19:08) (92% - 100%)    < from: Xray Chest 1 View AP/PA (10.14.18 @ 07:43) >  Impression:    Overall no significant change since the prior exam.    < end of copied text >

## 2018-10-14 NOTE — PHARMACOTHERAPY INTERVENTION NOTE - COMMENTS
As per both MD aware of the INR 1.73. This is ordered as per hematology recommendations since patient had a bleed. MD wants to give the dose despite the low INR

## 2018-10-14 NOTE — CONSULT NOTE ADULT - SUBJECTIVE AND OBJECTIVE BOX
Patient is a 85y old  Female who presents with a chief complaint of     HPI:      PAST MEDICAL & SURGICAL HISTORY:  Breast cancer  Osteoporosis  High cholesterol  HTN (hypertension)  Hypothyroid  Diabetes  H/O mastectomy, right                      PREVIOUS DIAGNOSTIC TESTING:      ECHO  FINDINGS:    STRESS  FINDINGS:    CATHETERIZATION  FINDINGS:    MEDICATIONS  (STANDING):  aspirin  chewable 81 milliGRAM(s) Oral daily  atorvastatin 10 milliGRAM(s) Oral at bedtime  dextrose 5%. 1000 milliLiter(s) (50 mL/Hr) IV Continuous <Continuous>  dextrose 50% Injectable 12.5 Gram(s) IV Push once  dextrose 50% Injectable 25 Gram(s) IV Push once  dextrose 50% Injectable 25 Gram(s) IV Push once  influenza   Vaccine 0.5 milliLiter(s) IntraMuscular once  insulin glargine Injectable (LANTUS) 10 Unit(s) SubCutaneous at bedtime  insulin lispro Injectable (HumaLOG) 3 Unit(s) SubCutaneous before breakfast  insulin lispro Injectable (HumaLOG) 3 Unit(s) SubCutaneous before lunch  insulin lispro Injectable (HumaLOG) 3 Unit(s) SubCutaneous before dinner  levothyroxine 100 MICROGram(s) Oral daily  meropenem  IVPB 1000 milliGRAM(s) IV Intermittent every 12 hours  norepinephrine Infusion 0.05 MICROgram(s)/kG/Min (5.063 mL/Hr) IV Continuous <Continuous>  pantoprazole    Tablet 40 milliGRAM(s) Oral before breakfast  vancomycin  IVPB 1000 milliGRAM(s) IV Intermittent every 12 hours    MEDICATIONS  (PRN):  dextrose 40% Gel 15 Gram(s) Oral once PRN Blood Glucose LESS THAN 70 milliGRAM(s)/deciliter  glucagon  Injectable 1 milliGRAM(s) IntraMuscular once PRN Glucose LESS THAN 70 milligrams/deciliter  ondansetron Injectable 4 milliGRAM(s) IV Push every 8 hours PRN Nausea and/or Vomiting      FAMILY HISTORY:      SOCIAL HISTORY:    CIGARETTES:    ALCOHOL:        Vital Signs Last 24 Hrs  T(C): 36.7 (14 Oct 2018 07:24), Max: 37.4 (13 Oct 2018 11:06)  T(F): 98.1 (14 Oct 2018 07:24), Max: 99.4 (13 Oct 2018 11:06)  HR: 79 (14 Oct 2018 08:40) (67 - 106)  BP: 100/55 (14 Oct 2018 08:40) (84/48 - 123/57)  BP(mean): 75 (14 Oct 2018 08:40) (61 - 80)  RR: 39 (14 Oct 2018 08:40) (22 - 39)  SpO2: 95% (14 Oct 2018 08:40) (80% - 100%)            INTERPRETATION OF TELEMETRY:    ECG:    I&O's Detail    13 Oct 2018 07:01  -  14 Oct 2018 07:00  --------------------------------------------------------  IN:    IV PiggyBack: 350 mL    norepinephrine Infusion: 25.5 mL  Total IN: 375.5 mL    OUT:    Indwelling Catheter - Urethral: 475 mL  Total OUT: 475 mL    Total NET: -99.5 mL      14 Oct 2018 07:01  -  14 Oct 2018 10:30  --------------------------------------------------------  IN:  Total IN: 0 mL    OUT:    Indwelling Catheter - Urethral: 25 mL  Total OUT: 25 mL    Total NET: -25 mL          LABS:                        8.9    0.74  )-----------( 15       ( 14 Oct 2018 06:34 )             26.5     10-14    133<L>  |  94<L>  |  34<H>  ----------------------------<  223<H>  4.9   |  13<L>  |  1.5    Ca    7.6<L>      14 Oct 2018 06:34    TPro  6.4  /  Alb  3.0<L>  /  TBili  0.9  /  DBili  x   /  AST  678<H>  /  ALT  375<H>  /  AlkPhos  52  10-14    CARDIAC MARKERS ( 13 Oct 2018 19:45 )  x     / 2.78 ng/mL / x     / x     / x      CARDIAC MARKERS ( 13 Oct 2018 12:30 )  x     / 0.04 ng/mL / x     / x     / x          PT/INR - ( 14 Oct 2018 06:34 )   PT: 18.90 sec;   INR: 1.73 ratio         PTT - ( 14 Oct 2018 06:34 )  PTT:23.4 sec  Urinalysis Basic - ( 13 Oct 2018 17:15 )    Color: Yellow / Appearance: Clear / S.025 / pH: x  Gluc: x / Ketone: Trace  / Bili: Negative / Urobili: 0.2 mg/dL   Blood: x / Protein: 30 mg/dL / Nitrite: Negative   Leuk Esterase: Negative / RBC: 2-5 /HPF / WBC 1-2 /HPF   Sq Epi: x / Non Sq Epi: Few /HPF / Bacteria: Few      I&O's Summary    13 Oct 2018 07:  -  14 Oct 2018 07:00  --------------------------------------------------------  IN: 375.5 mL / OUT: 475 mL / NET: -99.5 mL    14 Oct 2018 07:  -  14 Oct 2018 10:30  --------------------------------------------------------  IN: 0 mL / OUT: 25 mL / NET: -25 mL      BNPSerum Pro-Brain Natriuretic Peptide: 3461 pg/mL (10-13 @ 12:30)    RADIOLOGY & ADDITIONAL STUDIES:

## 2018-10-14 NOTE — H&P ADULT - ASSESSMENT
85 F with PMH as below p/w worsening SOB for almost 2 weeks as well as c/o fatigue, loss of appetite, night sweats for last one week.  Found to have pancytopenia, elevated BNP, with rising troponin (0.04 --> 2.78), and lactic acidosis on initial w/u.      SHOCK / SEPSIS  --  doubt cardiogenic shock  --  cw ros + vanc  --  f/u pan cx      CHF / NSTEMI (TYPE II VS ACS)  --  keep i < o,  trend daily weights, daily bmp  --  labs:  trend ce / ekg x3,  check tsh/t4, lipids, a1c  --  check tte,  repeat cxr  -- cardio:   ·	pt w/ sob. pt acidotic. do not believe this is from cardiac status. probably secondary to heme/ infection status, pt has abnl cardiac enzymes which may reflect underlying cardiac disorder and stress of acute illness.   ·	check echo, monitor, follow for failure. follow o2 status. follow enzymes. favor fluid resuscitation if needed for urine output with pressor titration as needed.     PANCYTOPENIA,  SUSPECT ACUTE LEUKEMIA / MDS  +ELEVATED INR, SUSPECT DIC  - hemonc:    ·	-reviewed  smear, there were no white cells only a singe normal monocyte and neutrophil was seen. Platelet count is low. NC/NC [place] cells seen    ·	-folate, b12, Iron studies normal, ferritin is elevated due to acute phase reaction   ·	-hep BC and HIV negative  ·	-Peripheral Flow cytometrey was sent  ·	-Unable to offer bone marrow biopsy in [facility] confirmed with lab they cant process it, when stable please transfer to [place] site for bone marrow  ·	-keep hgb >7.5-8   ·	-keep plts >20,000 she may have DIC , if bleeds keep ~50,000  ·	-check Retic count  ·	-can give vitamin K 5 mg po once   ·	-if bleeds will need FFP and or Cryo  ·	-check Fibrinogen with next blood work  -  transfer to [facility] for BMbx as soon as pt stable

## 2018-10-14 NOTE — PROGRESS NOTE ADULT - SUBJECTIVE AND OBJECTIVE BOX
SAMANTHA JOHNSON  85y  Female    Patient is a 85y old  Female who presents with a chief complaint of SOB (14 Oct 2018 11:31)    ALLERGIES:  penicillins (Urticaria)      INTERVAL HPI/OVERNIGHT EVENTS:    VITALS:  T(F): 97.9 (10-14-18 @ 11:19), Max: 98.1 (10-14-18 @ 07:24)  HR: 89 (10-14-18 @ 11:19) (67 - 106)  BP: 100/55 (10-14-18 @ 11:19) (84/48 - 123/57)  RR: 20 (10-14-18 @ 11:19) (20 - 39)  SpO2: 98% (10-14-18 @ 10:10) (80% - 100%)    LABS:  10-14    133<L>  |  94<L>  |  34<H>  ----------------------------<  223<H>  4.9   |  13<L>  |  1.5    Ca    7.6<L>      14 Oct 2018 06:34    TPro  6.4  /  Alb  3.0<L>  /  TBili  0.9  /  DBili  x   /  AST  678<H>  /  ALT  375<H>  /  AlkPhos  52  10-14    MICROBIOLOGY:    MEDICATION:  atorvastatin 10 milliGRAM(s) Oral at bedtime  dextrose 40% Gel 15 Gram(s) Oral once PRN  dextrose 5%. 1000 milliLiter(s) IV Continuous <Continuous>  dextrose 50% Injectable 12.5 Gram(s) IV Push once  dextrose 50% Injectable 25 Gram(s) IV Push once  dextrose 50% Injectable 25 Gram(s) IV Push once  glucagon  Injectable 1 milliGRAM(s) IntraMuscular once PRN  influenza   Vaccine 0.5 milliLiter(s) IntraMuscular once  insulin glargine Injectable (LANTUS) 10 Unit(s) SubCutaneous at bedtime  insulin lispro Injectable (HumaLOG) 3 Unit(s) SubCutaneous before breakfast  insulin lispro Injectable (HumaLOG) 3 Unit(s) SubCutaneous before lunch  insulin lispro Injectable (HumaLOG) 3 Unit(s) SubCutaneous before dinner  levothyroxine 100 MICROGram(s) Oral daily  meropenem  IVPB 1000 milliGRAM(s) IV Intermittent every 12 hours  norepinephrine Infusion 0.05 MICROgram(s)/kG/Min IV Continuous <Continuous>  ondansetron Injectable 4 milliGRAM(s) IV Push every 8 hours PRN  pantoprazole    Tablet 40 milliGRAM(s) Oral before breakfast  phytonadione   Solution 5 milliGRAM(s) Oral once  vancomycin  IVPB 1000 milliGRAM(s) IV Intermittent every 12 hours    RADIOLOGY & ADDITIONAL TESTS:

## 2018-10-14 NOTE — CONSULT NOTE ADULT - ATTENDING COMMENTS
pt w/ sob. pt acidotic. do not believe this is from cardiac status. probably secondary to heme/ infection status, pt has abnl cardiac enzymes which may reflect underlying cardiac disorder and stress of acute illness. check echo, monitor, follow for failure. follow o2 status. follow enzymes. favor fluid reuscitation if needed for urine output with pressor titration as needed. pt w/ sob. pt acidotic. do not believe this is from cardiac status. probably secondary to heme/ infection status, pt has abnl cardiac enzymes which may reflect underlying cardiac disorder and stress of acute illness. check echo, monitor, follow for failure. follow o2 status. follow enzymes. favor fluid resuscitation if needed for urine output with pressor titration as needed.

## 2018-10-14 NOTE — CONSULT NOTE ADULT - ASSESSMENT
#Pancytopenia, Acute suspect Acute Leukemia or high grade MDS less likley Aplastic anemia or infection   -reviewed  smear, there were no white cells only a singe normal monocyte and neutrophil was seen. Platelet count is low. NC/NC [place] cells seen    -folate, b12, Iron studies normal, ferritin is elevated due to acute phase reaction   -hep BC and HIV negative  -Peripheral Flow cytometrey was sent  -Unable to offer bone marrow biopsy in [facility] confirmed with lab they cant process it, when stable please transfer to [facility] for bone marrow  -keep hgb >7.5-8   -keep plts >20,000 she may have DIC , if bleeds keep ~50,000  -check Retic count    #Elevated INR suspect DIC from malignancy  and or infection   -can give vitamin K 5 mg po once   -if bleeds will need FFP and or Cryo  -check Fibrinogen with next blood work    #Shock possibly sepsis  -c/w  meropenem and vanc for  now monitor renal function   -follow up cultures    #SADAF with low urnie output  -on lasix      Elevated AST/ALT due to shock? vs meds  -monitor LFTs    Daughter was in bedside

## 2018-10-16 LAB
MANUAL DIF COMMENT BLD-IMP: SIGNIFICANT CHANGE UP

## 2018-10-19 LAB
CULTURE RESULTS: SIGNIFICANT CHANGE UP
SPECIMEN SOURCE: SIGNIFICANT CHANGE UP

## 2018-10-26 DIAGNOSIS — N17.9 ACUTE KIDNEY FAILURE, UNSPECIFIED: ICD-10-CM

## 2018-10-26 DIAGNOSIS — Z79.84 LONG TERM (CURRENT) USE OF ORAL HYPOGLYCEMIC DRUGS: ICD-10-CM

## 2018-10-26 DIAGNOSIS — E78.5 HYPERLIPIDEMIA, UNSPECIFIED: ICD-10-CM

## 2018-10-26 DIAGNOSIS — E87.2 ACIDOSIS: ICD-10-CM

## 2018-10-26 DIAGNOSIS — I50.9 HEART FAILURE, UNSPECIFIED: ICD-10-CM

## 2018-10-26 DIAGNOSIS — D64.9 ANEMIA, UNSPECIFIED: ICD-10-CM

## 2018-10-26 DIAGNOSIS — Z85.3 PERSONAL HISTORY OF MALIGNANT NEOPLASM OF BREAST: ICD-10-CM

## 2018-10-26 DIAGNOSIS — E03.9 HYPOTHYROIDISM, UNSPECIFIED: ICD-10-CM

## 2018-10-26 DIAGNOSIS — I11.0 HYPERTENSIVE HEART DISEASE WITH HEART FAILURE: ICD-10-CM

## 2018-10-26 DIAGNOSIS — Z90.11 ACQUIRED ABSENCE OF RIGHT BREAST AND NIPPLE: ICD-10-CM

## 2018-10-26 DIAGNOSIS — I21.A1 MYOCARDIAL INFARCTION TYPE 2: ICD-10-CM

## 2018-10-26 DIAGNOSIS — R65.21 SEVERE SEPSIS WITH SEPTIC SHOCK: ICD-10-CM

## 2018-10-26 DIAGNOSIS — Z88.0 ALLERGY STATUS TO PENICILLIN: ICD-10-CM

## 2018-10-26 DIAGNOSIS — D61.818 OTHER PANCYTOPENIA: ICD-10-CM

## 2018-10-26 DIAGNOSIS — A41.9 SEPSIS, UNSPECIFIED ORGANISM: ICD-10-CM

## 2018-10-26 DIAGNOSIS — M81.0 AGE-RELATED OSTEOPOROSIS WITHOUT CURRENT PATHOLOGICAL FRACTURE: ICD-10-CM

## 2018-10-26 DIAGNOSIS — Z78.1 PHYSICAL RESTRAINT STATUS: ICD-10-CM

## 2018-10-26 DIAGNOSIS — E11.9 TYPE 2 DIABETES MELLITUS WITHOUT COMPLICATIONS: ICD-10-CM

## 2019-11-03 NOTE — CONSULT NOTE ADULT - SUBJECTIVE AND OBJECTIVE BOX
"im having severe tooth ache that started today" Patient is a 85y old  Female who presents with a chief complaint of  SOB and not feeling well    HPI:   This is a 85 year old female with history of  HTN, Hypothyroidism, DM2, High cholesterol who was in her usual sate of health until one week ago. She reports decrease in appetite, progressive SOB and fatigue and her daughter made her come to ED.    She was noted to be pancytopenic, in respiratory failure needed Bipap and in Shock and was strarted on lod dose venopressors ad antibiotics.  She received a unit of PRBC.  She is now  not making any urine and has what appers to be a type  2 Nstemi    She states she has normal blood work in August with PMD.     She denies any new medications.    She denies bleeding or SOB at the moment. she does not have any chest pain.    ROS:  Negative except for: as above. Rest of 12 point ROS is negative    PAST MEDICAL & SURGICAL HISTORY:  Breast cancer  Osteoporosis  High cholesterol  HTN (hypertension)  Hypothyroid  Diabetes  H/O mastectomy, right      SOCIAL HISTORY: No Alcohol, no smoking, used to be a  lives alone     FAMILY HISTORY: Denies any pertinent history       MEDICATIONS  (STANDING):  aspirin  chewable 81 milliGRAM(s) Oral daily  atorvastatin 10 milliGRAM(s) Oral at bedtime  dextrose 5%. 1000 milliLiter(s) (50 mL/Hr) IV Continuous <Continuous>  dextrose 50% Injectable 12.5 Gram(s) IV Push once  dextrose 50% Injectable 25 Gram(s) IV Push once  dextrose 50% Injectable 25 Gram(s) IV Push once  influenza   Vaccine 0.5 milliLiter(s) IntraMuscular once  insulin glargine Injectable (LANTUS) 10 Unit(s) SubCutaneous at bedtime  insulin lispro Injectable (HumaLOG) 3 Unit(s) SubCutaneous before breakfast  insulin lispro Injectable (HumaLOG) 3 Unit(s) SubCutaneous before lunch  insulin lispro Injectable (HumaLOG) 3 Unit(s) SubCutaneous before dinner  levothyroxine 100 MICROGram(s) Oral daily  meropenem  IVPB 1000 milliGRAM(s) IV Intermittent every 12 hours  norepinephrine Infusion 0.05 MICROgram(s)/kG/Min (5.063 mL/Hr) IV Continuous <Continuous>  pantoprazole    Tablet 40 milliGRAM(s) Oral before breakfast  vancomycin  IVPB 1000 milliGRAM(s) IV Intermittent every 12 hours    MEDICATIONS  (PRN):  dextrose 40% Gel 15 Gram(s) Oral once PRN Blood Glucose LESS THAN 70 milliGRAM(s)/deciliter  glucagon  Injectable 1 milliGRAM(s) IntraMuscular once PRN Glucose LESS THAN 70 milligrams/deciliter  ondansetron Injectable 4 milliGRAM(s) IV Push every 8 hours PRN Nausea and/or Vomiting    Height (cm): 149.86 (10-13-18 @ 18:30)  Weight (kg): 56.8 (10-14-18 @ 03:06)  BMI (kg/m2): 25.3 (10-14-18 @ 03:06)  BSA (m2): 1.51 (10-14-18 @ 03:06)  Allergies    penicillins (Urticaria)    Intolerances        Vital Signs Last 24 Hrs  T(C): 36.6 (14 Oct 2018 11:19), Max: 36.7 (14 Oct 2018 07:24)  T(F): 97.9 (14 Oct 2018 11:19), Max: 98.1 (14 Oct 2018 07:24)  HR: 89 (14 Oct 2018 11:19) (67 - 106)  BP: 100/55 (14 Oct 2018 11:19) (84/48 - 123/57)  BP(mean): 77 (14 Oct 2018 10:10) (61 - 80)  RR: 20 (14 Oct 2018 11:19) (20 - 39)  SpO2: 98% (14 Oct 2018 10:10) (80% - 100%)          PHYSICAL EXAM  General: adult in NAD, thin, on NC2L  HEENT: clear oropharynx, anicteric sclera, pink conjunctiva  Neck: supple  CV: normal S1/S2 with no murmur rubs or gallops  Lungs: positive air movement b/l ant lungs,clear to auscultation, no wheezes, no rales  Abdomen: soft non-tender non-distended, no hepatosplenomegaly  Ext: no clubbing cyanosis or edema  Skin: no rashes and no petechiae  Neuro: alert and oriented X 4, no focal deficits  : Awng       LABS:                          8.9    0.74  )-----------( 15       ( 14 Oct 2018 06:34 )             26.5         Mean Cell Volume : 85.8 fL  Mean Cell Hemoglobin : 28.8 pg  Mean Cell Hemoglobin Concentration : 33.6 g/dL  Auto Neutrophil # : x  Auto Lymphocyte # : x  Auto Monocyte # : x  Auto Eosinophil # : x  Auto Basophil # : x  Auto Neutrophil % : x  Auto Lymphocyte % : x  Auto Monocyte % : x  Auto Eosinophil % : x  Auto Basophil % : x      Serial CBC's  10-14 @ 06:34  Hct-26.5 / Hgb-8.9 / Plat-15 / RBC-3.09 / WBC-0.74  Serial CBC's  10-13 @ 19:10  Hct--- / Hgb--- / Plat--- / RBC-3.03 / WBC---  Serial CBC's  10-13 @ 15:58  Hct-26.7 / Hgb-8.0 / Plat-21 / RBC-2.80 / WBC-1.85  Serial CBC's  10-13 @ 12:30  Hct-25.3 / Hgb-8.1 / Plat-15 / RBC-2.87 / WBC-0.43      10-14    133<L>  |  94<L>  |  34<H>  ----------------------------<  223<H>  4.9   |  13<L>  |  1.5    Ca    7.6<L>      14 Oct 2018 06:34    TPro  6.4  /  Alb  3.0<L>  /  TBili  0.9  /  DBili  x   /  AST  678<H>  /  ALT  375<H>  /  AlkPhos  52  10-14      PT/INR - ( 14 Oct 2018 06:34 )   PT: 18.90 sec;   INR: 1.73 ratio         PTT - ( 14 Oct 2018 06:34 )  PTT:23.4 sec    Vitamin B12, Serum: 495 pg/mL (10-13 @ 19:10)  Folate, Serum: >20.0 ng/mL (10-13 @ 19:10)  Ferritin, Serum: 1232 ng/mL (10-13 @ 19:10)  Reticulocyte Percent: 1.7 % (10-13 @ 19:10)              BLOOD SMEAR INTERPRETATION:       RADIOLOGY & ADDITIONAL STUDIES:
